# Patient Record
Sex: FEMALE | Race: WHITE | NOT HISPANIC OR LATINO
[De-identification: names, ages, dates, MRNs, and addresses within clinical notes are randomized per-mention and may not be internally consistent; named-entity substitution may affect disease eponyms.]

---

## 2021-02-05 ENCOUNTER — APPOINTMENT (OUTPATIENT)
Dept: INTERNAL MEDICINE | Facility: CLINIC | Age: 71
End: 2021-02-05
Payer: MEDICARE

## 2021-02-05 VITALS
HEART RATE: 66 BPM | DIASTOLIC BLOOD PRESSURE: 70 MMHG | HEIGHT: 68 IN | SYSTOLIC BLOOD PRESSURE: 100 MMHG | BODY MASS INDEX: 27.28 KG/M2 | OXYGEN SATURATION: 96 % | WEIGHT: 180 LBS | TEMPERATURE: 97.4 F

## 2021-02-05 DIAGNOSIS — Z80.9 FAMILY HISTORY OF MALIGNANT NEOPLASM, UNSPECIFIED: ICD-10-CM

## 2021-02-05 DIAGNOSIS — Z87.891 PERSONAL HISTORY OF NICOTINE DEPENDENCE: ICD-10-CM

## 2021-02-05 DIAGNOSIS — Z82.49 FAMILY HISTORY OF ISCHEMIC HEART DISEASE AND OTHER DISEASES OF THE CIRCULATORY SYSTEM: ICD-10-CM

## 2021-02-05 DIAGNOSIS — M85.80 OTHER SPECIFIED DISORDERS OF BONE DENSITY AND STRUCTURE, UNSPECIFIED SITE: ICD-10-CM

## 2021-02-05 DIAGNOSIS — Z72.89 OTHER PROBLEMS RELATED TO LIFESTYLE: ICD-10-CM

## 2021-02-05 DIAGNOSIS — Z81.8 FAMILY HISTORY OF OTHER MENTAL AND BEHAVIORAL DISORDERS: ICD-10-CM

## 2021-02-05 PROCEDURE — 99204 OFFICE O/P NEW MOD 45 MIN: CPT | Mod: 25

## 2021-02-05 RX ORDER — VALACYCLOVIR HYDROCHLORIDE 500 MG/1
500 TABLET, FILM COATED ORAL
Refills: 0 | Status: ACTIVE | COMMUNITY

## 2021-02-05 NOTE — PLAN
[FreeTextEntry1] : Asthma- controlled on minimal use of albuterol -continue prn use\par \par osteopenia- continue weight bearing exercises\par \par Anxiety- for prn use of benzo.\par \par HCM= uptodate= plan wellness with me or in [facility] this summer

## 2021-02-05 NOTE — HISTORY OF PRESENT ILLNESS
[de-identified] : Needs a new PMD: \par Father  of leukemia at age of 60 - [employer]\par 5 brothers- one developed MDS.  Has a mutant gene.  \par Mother with hx of dvt\par one brother had a CVA.    niece with hereditary telengiectasia\par SHe has been\par Cervical dysplasia, benign breast lumpectomy\par tubal ligation\par wrist fracture in \par Had a CCTA of 38, she does have cholesterol elevation  does not like taking medication.  \par Takes valacyclovir tiw for herpes outbreasks  \par dx of osteopenia-  does weight lifting and veryactive.  took fosmaax years ago.  \par Hx of IBS- meditation helped.\par Colonoscopy  was normal - Ezio Guo\par Mammo last week\par Dexa- slight change\par OPhtho check up yesterday.  \par Had covid vaccine  on  \par Had zostavax has not had shingrix. HD BOTH PNA ACCINATIONS.  \par Last wellness in august.in [facility].  \par Takes vitamin D 2000 iu daily, calcium 500mg,

## 2021-02-24 ENCOUNTER — TRANSCRIPTION ENCOUNTER (OUTPATIENT)
Age: 71
End: 2021-02-24

## 2021-02-26 ENCOUNTER — TRANSCRIPTION ENCOUNTER (OUTPATIENT)
Age: 71
End: 2021-02-26

## 2021-09-09 ENCOUNTER — TRANSCRIPTION ENCOUNTER (OUTPATIENT)
Age: 71
End: 2021-09-09

## 2022-03-10 LAB
ALBUMIN SERPL ELPH-MCNC: 4.5 G/DL
ALP BLD-CCNC: 88 U/L
ALT SERPL-CCNC: 18 U/L
ANION GAP SERPL CALC-SCNC: 13 MMOL/L
AST SERPL-CCNC: 24 U/L
BASOPHILS # BLD AUTO: 0.05 K/UL
BASOPHILS NFR BLD AUTO: 0.8 %
BILIRUB SERPL-MCNC: 0.2 MG/DL
BUN SERPL-MCNC: 22 MG/DL
CALCIUM SERPL-MCNC: 9.5 MG/DL
CHLORIDE SERPL-SCNC: 104 MMOL/L
CHOLEST SERPL-MCNC: 264 MG/DL
CO2 SERPL-SCNC: 26 MMOL/L
CREAT SERPL-MCNC: 1.1 MG/DL
EGFR: 53 ML/MIN/1.73M2
EOSINOPHIL # BLD AUTO: 0.16 K/UL
EOSINOPHIL NFR BLD AUTO: 2.6 %
GLUCOSE SERPL-MCNC: 108 MG/DL
HCT VFR BLD CALC: 43 %
HDLC SERPL-MCNC: 69 MG/DL
HGB BLD-MCNC: 13.6 G/DL
IMM GRANULOCYTES NFR BLD AUTO: 0.2 %
LDLC SERPL CALC-MCNC: 174 MG/DL
LYMPHOCYTES # BLD AUTO: 2.13 K/UL
LYMPHOCYTES NFR BLD AUTO: 34.6 %
MAN DIFF?: NORMAL
MCHC RBC-ENTMCNC: 31.5 PG
MCHC RBC-ENTMCNC: 31.6 GM/DL
MCV RBC AUTO: 99.5 FL
MONOCYTES # BLD AUTO: 0.44 K/UL
MONOCYTES NFR BLD AUTO: 7.1 %
NEUTROPHILS # BLD AUTO: 3.37 K/UL
NEUTROPHILS NFR BLD AUTO: 54.7 %
NONHDLC SERPL-MCNC: 195 MG/DL
PLATELET # BLD AUTO: 204 K/UL
POTASSIUM SERPL-SCNC: 4.4 MMOL/L
PROT SERPL-MCNC: 7.2 G/DL
RBC # BLD: 4.32 M/UL
RBC # FLD: 12.7 %
SODIUM SERPL-SCNC: 143 MMOL/L
TRIGL SERPL-MCNC: 106 MG/DL
TSH SERPL-ACNC: 2.64 UIU/ML
WBC # FLD AUTO: 6.16 K/UL

## 2022-04-19 ENCOUNTER — APPOINTMENT (OUTPATIENT)
Dept: INTERNAL MEDICINE | Facility: CLINIC | Age: 72
End: 2022-04-19
Payer: MEDICARE

## 2022-04-19 DIAGNOSIS — G89.29 LOW BACK PAIN, UNSPECIFIED: ICD-10-CM

## 2022-04-19 DIAGNOSIS — M54.50 LOW BACK PAIN, UNSPECIFIED: ICD-10-CM

## 2022-04-19 DIAGNOSIS — F41.9 ANXIETY DISORDER, UNSPECIFIED: ICD-10-CM

## 2022-04-19 PROCEDURE — 99213 OFFICE O/P EST LOW 20 MIN: CPT | Mod: 95

## 2022-04-19 NOTE — HISTORY OF PRESENT ILLNESS
[Home] : at home, [unfilled] , at the time of the visit. [Medical Office: ([facility])___] : at the medical office located in  [Verbal consent obtained from patient] : the patient, [unfilled] [de-identified] : \par C[place]rently down in [place]\par has high cholesterol\par Calcium scan in 2018 - score of 38.   plan to recheck in 5 years.    \par exercising regularly - eats well.  \par paternal gf  at 75 cad.  \par Needs refill of xanax 0.5mg to [facility].  \par had zostavax but not shingrix.  \par low back pain issues.   - exercise has helped

## 2022-04-19 NOTE — PLAN
[FreeTextEntry1] : Asthma- controlled on minimal use of albuterol -continue prn use\par \par low back pain - exercise as tolerated and monitor\par \par Anxiety-refill of xanax for prn use\par \par

## 2022-06-30 ENCOUNTER — TRANSCRIPTION ENCOUNTER (OUTPATIENT)
Age: 72
End: 2022-06-30

## 2022-09-15 ENCOUNTER — APPOINTMENT (OUTPATIENT)
Dept: HEART AND VASCULAR | Facility: CLINIC | Age: 72
End: 2022-09-15

## 2022-09-15 ENCOUNTER — NON-APPOINTMENT (OUTPATIENT)
Age: 72
End: 2022-09-15

## 2022-09-15 VITALS
TEMPERATURE: 98.1 F | HEART RATE: 72 BPM | OXYGEN SATURATION: 96 % | WEIGHT: 185 LBS | HEIGHT: 68 IN | BODY MASS INDEX: 28.04 KG/M2 | SYSTOLIC BLOOD PRESSURE: 98 MMHG | DIASTOLIC BLOOD PRESSURE: 67 MMHG

## 2022-09-15 PROCEDURE — 93000 ELECTROCARDIOGRAM COMPLETE: CPT

## 2022-09-15 PROCEDURE — 99205 OFFICE O/P NEW HI 60 MIN: CPT | Mod: 25

## 2022-09-15 PROCEDURE — 36415 COLL VENOUS BLD VENIPUNCTURE: CPT

## 2022-09-15 RX ORDER — ASPIRIN 81 MG/1
81 TABLET ORAL DAILY
Qty: 30 | Refills: 6 | Status: ACTIVE | COMMUNITY
Start: 2022-09-15 | End: 1900-01-01

## 2022-09-15 NOTE — HISTORY OF PRESENT ILLNESS
[FreeTextEntry1] : \par 73 y/o female with h/o cad, anxiety, asthma, hl, overweight, family h/o early cvd who presents for initial evaluation today.\par \par no cp, sob, syncope, lh, palpitations\par no orthopnea, pnd, edema\par no fatigue\par \par Calcium score 2018: 38\par \par Echo : normal LV size/fxn, ef 55-60%, no wma, mlld diastolic dysfunction, mild mac, trace mr/tr, asc ao 3.6 cm upper limit normal to mildly dilated\par \par tested by nancy - Dr. Meek [facility] - for clotting d/o - negative \par \par swims 2-3 times/week, walks 8-21222 steps daily, strength balance class, yoga\par no mental health issues\par no pregnancy complications\par stress level low - meditates \par eats healthy diet \par \par had covid \par \par PMH/PSH:\par asthma\par hl\par anxiety\par osteopenia\par cad\par benign breast lumpectomy\par tubal ligation\par herpes\par IBS\par overweight\par abortions\par covid \par \par ALL:\par sulfa\par \par \par MEDS:\par albuterol prn\par alprazolam .25 mg prn\par valtrex 500 mg three times/week\par THC gummies \par calcium\par vit D\par probiotic \par \par \par SH:\par former smoker in [age] y/o, 3-4 cigs/day\par etoh\par no drugs\par \par 2 children 40's - healthy\par from [place]\par lived [place] since \par writer\par \par \par FH:\par mother - h/o dvt,  86\par father -  leukemia 60\par 5 brothers - (1 brother - cva age 29, 1 brother - leukemia, 1 brother - cva 55)\par

## 2022-09-15 NOTE — DISCUSSION/SUMMARY
[Patient] : the patient [EKG obtained to assist in diagnosis and management of assessed problem(s)] : EKG obtained to assist in diagnosis and management of assessed problem(s) [___ Month(s)] : in [unfilled] month(s) [FreeTextEntry1] : 71 y/o female with h/o cad, anxiety, asthma, hl, overweight, family h/o early cvd who presents for initial evaluation today.\par \par -ordered ekg today - SR w pac, rbbb, no st/t changes\par -holter ordered for pac's\par -labs ordered\par -asa, statin given cad - start lipitor 40 mg qhs\par -Calcium score 2018: 38\par -Echo 9/21: normal LV size/fxn, ef 55-60%, mlld diastolic dysfunction, mild mac, trace mr/tr, asc ao 3.6 cm upper limit normal to mildly dilated\par -counseled on cvd risk factors\par -f/up 3 months for lipid\par \par I have spent 60 minutes reviewing labs, records, tests and discussing cad, cvd management

## 2022-09-19 LAB
ALBUMIN SERPL ELPH-MCNC: 4.5 G/DL
ALP BLD-CCNC: 94 U/L
ALT SERPL-CCNC: 21 U/L
ANION GAP SERPL CALC-SCNC: 13 MMOL/L
APPEARANCE: CLEAR
AST SERPL-CCNC: 26 U/L
BACTERIA: NEGATIVE
BASOPHILS # BLD AUTO: 0.05 K/UL
BASOPHILS NFR BLD AUTO: 0.8 %
BILIRUB SERPL-MCNC: 0.4 MG/DL
BILIRUBIN URINE: NEGATIVE
BLOOD URINE: NEGATIVE
BUN SERPL-MCNC: 18 MG/DL
CALCIUM SERPL-MCNC: 9.5 MG/DL
CHLORIDE SERPL-SCNC: 105 MMOL/L
CHOLEST SERPL-MCNC: 280 MG/DL
CO2 SERPL-SCNC: 24 MMOL/L
COLOR: NORMAL
CREAT SERPL-MCNC: 0.96 MG/DL
EGFR: 62 ML/MIN/1.73M2
EOSINOPHIL # BLD AUTO: 0.15 K/UL
EOSINOPHIL NFR BLD AUTO: 2.4 %
ESTIMATED AVERAGE GLUCOSE: 123 MG/DL
GLUCOSE QUALITATIVE U: NEGATIVE
GLUCOSE SERPL-MCNC: 96 MG/DL
HBA1C MFR BLD HPLC: 5.9 %
HCT VFR BLD CALC: 43.2 %
HDLC SERPL-MCNC: 79 MG/DL
HGB BLD-MCNC: 13.5 G/DL
HYALINE CASTS: 0 /LPF
IMM GRANULOCYTES NFR BLD AUTO: 0.2 %
KETONES URINE: NEGATIVE
LDLC SERPL CALC-MCNC: 186 MG/DL
LEUKOCYTE ESTERASE URINE: NEGATIVE
LYMPHOCYTES # BLD AUTO: 1.84 K/UL
LYMPHOCYTES NFR BLD AUTO: 29.7 %
MAGNESIUM SERPL-MCNC: 2.2 MG/DL
MAN DIFF?: NORMAL
MCHC RBC-ENTMCNC: 30.3 PG
MCHC RBC-ENTMCNC: 31.3 GM/DL
MCV RBC AUTO: 97.1 FL
MICROSCOPIC-UA: NORMAL
MONOCYTES # BLD AUTO: 0.44 K/UL
MONOCYTES NFR BLD AUTO: 7.1 %
NEUTROPHILS # BLD AUTO: 3.71 K/UL
NEUTROPHILS NFR BLD AUTO: 59.8 %
NITRITE URINE: NEGATIVE
NONHDLC SERPL-MCNC: 202 MG/DL
PH URINE: 6
PLATELET # BLD AUTO: 207 K/UL
POTASSIUM SERPL-SCNC: 4.3 MMOL/L
PROT SERPL-MCNC: 7.2 G/DL
PROTEIN URINE: NEGATIVE
RBC # BLD: 4.45 M/UL
RBC # FLD: 13.4 %
RED BLOOD CELLS URINE: 4 /HPF
SODIUM SERPL-SCNC: 142 MMOL/L
SPECIFIC GRAVITY URINE: 1.02
SQUAMOUS EPITHELIAL CELLS: 1 /HPF
TRIGL SERPL-MCNC: 80 MG/DL
TSH SERPL-ACNC: 1.95 UIU/ML
UROBILINOGEN URINE: NORMAL
WBC # FLD AUTO: 6.2 K/UL
WHITE BLOOD CELLS URINE: 1 /HPF

## 2022-09-21 ENCOUNTER — APPOINTMENT (OUTPATIENT)
Dept: INTERNAL MEDICINE | Facility: CLINIC | Age: 72
End: 2022-09-21

## 2022-09-21 VITALS
OXYGEN SATURATION: 98 % | HEART RATE: 68 BPM | BODY MASS INDEX: 28.04 KG/M2 | HEIGHT: 68 IN | WEIGHT: 185 LBS | SYSTOLIC BLOOD PRESSURE: 106 MMHG | DIASTOLIC BLOOD PRESSURE: 70 MMHG | TEMPERATURE: 97.8 F

## 2022-09-21 DIAGNOSIS — J45.909 UNSPECIFIED ASTHMA, UNCOMPLICATED: ICD-10-CM

## 2022-09-21 DIAGNOSIS — Z23 ENCOUNTER FOR IMMUNIZATION: ICD-10-CM

## 2022-09-21 DIAGNOSIS — I25.10 ATHEROSCLEROTIC HEART DISEASE OF NATIVE CORONARY ARTERY W/OUT ANGINA PECTORIS: ICD-10-CM

## 2022-09-21 PROCEDURE — G0439: CPT

## 2022-09-21 NOTE — HISTORY OF PRESENT ILLNESS
[de-identified] : 73 yo f with HLD, IBS, osteopenia\par \par over the summer ate a lot of ice cream  -  has seen Dr Burnett.  started on lipitor last week.\par takes prn allegra, probiotic for her iBS, metamucil, calcium with D, magnesium at night.\par Colonoscopy 2018 was normal - Ezio Guo\par mammo \par  dexa last year\par still walking a lot, weights twice a week and some yoga.\par recent labs a1c of 5/9%\par already had flu\par  mild covid  last month  \par rash on right side - concerned for shingle

## 2022-09-21 NOTE — HISTORY OF PRESENT ILLNESS
[de-identified] : 71 yo f with HLD, IBS, osteopenia\par \par over the summer ate a lot of ice cream  -  has seen Dr Burnett.  started on lipitor last week.\par takes prn allegra, probiotic for her iBS, metamucil, calcium with D, magnesium at night.\par Colonoscopy 2018 was normal - Ezio Guo\par mammo \par  dexa last year\par still walking a lot, weights twice a week and some yoga.\par recent labs a1c of 5/9%\par already had flu\par  mild covid  last month  \par rash on right side - concerned for shingle

## 2022-09-21 NOTE — PLAN
[FreeTextEntry1] : wellness complete\par labs  reviewed\par \par Asthma- controlled on minimal use of albuterol -continue prn use\par \par ASCVD - statin and lifestyle changes.\par f/up with Dr Burnett\par \par Anxiety-refill of xanax for prn use\par \par

## 2022-09-21 NOTE — PHYSICAL EXAM
[No Edema] : there was no peripheral edema [Normal] : affect was normal and insight and judgment were intact aching

## 2022-09-22 ENCOUNTER — APPOINTMENT (OUTPATIENT)
Dept: CARDIOLOGY | Facility: CLINIC | Age: 72
End: 2022-09-22

## 2022-09-22 DIAGNOSIS — E66.3 OVERWEIGHT: ICD-10-CM

## 2022-09-22 DIAGNOSIS — R73.03 PREDIABETES.: ICD-10-CM

## 2022-09-22 DIAGNOSIS — E78.5 HYPERLIPIDEMIA, UNSPECIFIED: ICD-10-CM

## 2022-09-22 PROCEDURE — 97802 MEDICAL NUTRITION INDIV IN: CPT | Mod: 95

## 2022-11-14 RX ORDER — ALBUTEROL SULFATE 90 UG/1
108 (90 BASE) INHALANT RESPIRATORY (INHALATION) EVERY 4 HOURS
Qty: 1 | Refills: 11 | Status: ACTIVE | COMMUNITY
Start: 2021-02-05 | End: 1900-01-01

## 2022-11-20 ENCOUNTER — NON-APPOINTMENT (OUTPATIENT)
Age: 72
End: 2022-11-20

## 2022-11-22 RX ORDER — FLUTICASONE PROPIONATE 110 UG/1
110 AEROSOL, METERED RESPIRATORY (INHALATION) TWICE DAILY
Qty: 1 | Refills: 0 | Status: ACTIVE | COMMUNITY
Start: 2022-11-22 | End: 1900-01-01

## 2022-11-22 RX ORDER — BENZONATATE 100 MG/1
100 CAPSULE ORAL
Qty: 90 | Refills: 0 | Status: DISCONTINUED | COMMUNITY
Start: 2022-11-21 | End: 2022-11-22

## 2022-11-24 ENCOUNTER — NON-APPOINTMENT (OUTPATIENT)
Age: 72
End: 2022-11-24

## 2022-11-25 ENCOUNTER — NON-APPOINTMENT (OUTPATIENT)
Age: 72
End: 2022-11-25

## 2022-12-15 ENCOUNTER — APPOINTMENT (OUTPATIENT)
Dept: HEART AND VASCULAR | Facility: CLINIC | Age: 72
End: 2022-12-15

## 2022-12-15 VITALS
HEIGHT: 68 IN | TEMPERATURE: 97.7 F | DIASTOLIC BLOOD PRESSURE: 46 MMHG | OXYGEN SATURATION: 95 % | SYSTOLIC BLOOD PRESSURE: 111 MMHG | HEART RATE: 70 BPM | WEIGHT: 178 LBS | BODY MASS INDEX: 26.98 KG/M2

## 2022-12-15 PROCEDURE — 36415 COLL VENOUS BLD VENIPUNCTURE: CPT

## 2022-12-15 PROCEDURE — 99214 OFFICE O/P EST MOD 30 MIN: CPT | Mod: 25

## 2022-12-15 NOTE — HISTORY OF PRESENT ILLNESS
[FreeTextEntry1] : 71 y/o female with h/o cad, anxiety, asthma, hl, predm, pac's, overweight, family h/o early cvd who presents for f/up today\par \par last seen \par \par started on asa and lipitor \par \par has persistent cough after viral URI - has h/o asthma - has not seen pulm \par \par \par holter ordered\par \par Holter : SR, avg hr 67, 775 pvc's, [id number] pac's, 14% total beats, psvt\par \par planning to see EP next month\par \par no cp, sob, syncope, lh, palpitations\par no orthopnea, pnd, edema\par no fatigue\par \par Calcium score 2018: 38\par \par Echo : normal LV size/fxn, ef 55-60%, no wma, mlld diastolic dysfunction, mild mac, trace mr/tr, asc ao 3.6 cm upper limit normal to mildly dilated\par \par tested by nancy - Dr. Meek [facility] - for clotting d/o - negative \par \par swims 2-3 times/week, walks 8-32304 steps daily, strength balance class, yoga\par no mental health issues\par no pregnancy complications\par stress level low - meditates \par eats healthy diet \par \par had covid \par \par PMH/PSH:\par asthma\par hl\par anxiety\par osteopenia\par cad\par benign breast lumpectomy\par tubal ligation\par herpes\par IBS\par overweight\par abortions\par covid \par predm\par pac's\par \par ALL:\par sulfa\par \par \par MEDS:\par albuterol prn\par alprazolam .25 mg prn\par valtrex 500 mg three times/week\par THC gummies \par calcium\par vit D\par probiotic \par asa 81 mg qd\par lipitor 20 mg qhs\par \par SH:\par former smoker in [age] y/o, 3-4 cigs/day\par etoh\par no drugs\par \par 2 children 40's - healthy\par from [place]\par lived [place] since \par writer\par \par \par FH:\par mother - h/o dvt,  86\par father -  leukemia 60\par 5 brothers - (1 brother - cva age 29, 1 brother - leukemia, 1 brother - cva 55)\par

## 2022-12-15 NOTE — DISCUSSION/SUMMARY
[Patient] : the patient [___ Month(s)] : in [unfilled] month(s) [FreeTextEntry1] : 71 y/o female with h/o cad, anxiety, asthma, hl, overweight, predm, pac's, family h/o early cvd who presents for f/up today.\par \par -ekg 9/22 - SR w pac, rbbb, no st/t changes\par -Holter 2022: SR, avg hr 67, 775 pvc's, 51666 pac's, 14% total beats, psvt\par -EP referral for pac's\par -labs 2022 reviewed\par -continue asa, statin \par -ordered lipids, A1c today \par -refer to pulm for asthma\par -Calcium score 2018: 38\par -Echo 9/21: normal LV size/fxn, ef 55-60%, mlld diastolic dysfunction, mild mac, trace mr/tr, asc ao 3.6 cm upper limit normal to mildly dilated\par -counseled on cvd risk factors\par -f/up 4-6 months for lipids\par \par I have spent 30 minutes reviewing labs, records, tests and discussing cad, cvd management, pac's. \par \par

## 2022-12-19 LAB
CHOLEST SERPL-MCNC: 178 MG/DL
ESTIMATED AVERAGE GLUCOSE: 120 MG/DL
HBA1C MFR BLD HPLC: 5.8 %
HDLC SERPL-MCNC: 65 MG/DL
LDLC SERPL CALC-MCNC: 99 MG/DL
NONHDLC SERPL-MCNC: 113 MG/DL
TRIGL SERPL-MCNC: 68 MG/DL

## 2022-12-20 ENCOUNTER — TRANSCRIPTION ENCOUNTER (OUTPATIENT)
Age: 72
End: 2022-12-20

## 2022-12-21 ENCOUNTER — NON-APPOINTMENT (OUTPATIENT)
Age: 72
End: 2022-12-21

## 2022-12-31 RX ORDER — ALPRAZOLAM 0.5 MG/1
0.5 TABLET ORAL
Qty: 30 | Refills: 0 | Status: ACTIVE | COMMUNITY
Start: 2021-02-05 | End: 1900-01-01

## 2023-01-13 ENCOUNTER — APPOINTMENT (OUTPATIENT)
Dept: PULMONOLOGY | Facility: CLINIC | Age: 73
End: 2023-01-13
Payer: MEDICARE

## 2023-01-13 VITALS
OXYGEN SATURATION: 96 % | WEIGHT: 178 LBS | HEIGHT: 68 IN | SYSTOLIC BLOOD PRESSURE: 110 MMHG | BODY MASS INDEX: 26.98 KG/M2 | HEART RATE: 62 BPM | DIASTOLIC BLOOD PRESSURE: 73 MMHG

## 2023-01-13 PROCEDURE — 95012 NITRIC OXIDE EXP GAS DETER: CPT

## 2023-01-13 PROCEDURE — 94010 BREATHING CAPACITY TEST: CPT

## 2023-01-13 PROCEDURE — 99204 OFFICE O/P NEW MOD 45 MIN: CPT | Mod: 25

## 2023-01-13 RX ORDER — AMOXICILLIN AND CLAVULANATE POTASSIUM 500; 125 MG/1; MG/1
500-125 TABLET, FILM COATED ORAL
Qty: 14 | Refills: 0 | Status: DISCONTINUED | COMMUNITY
Start: 2022-11-25 | End: 2023-01-13

## 2023-01-13 NOTE — ASSESSMENT
[FreeTextEntry1] : Data reviewed:\par \par Labs notable for eos 150-160 in 2022\par \par PA/lat CXR [facility] 11/2022 personally reviewed : clear lungs\par \par [place] 01/13/2023 : normal, FEV1 80% / FENO 31\par \par Impression:\par Recovering from URI\par Childhood asthma\par \par Plan:\par No evidence of any active asthma today, nothing that needs to be treated.\par Welcome to see me at any time for new concerns.\par Reports having had pneumococcal vax at pharmacy.

## 2023-01-13 NOTE — HISTORY OF PRESENT ILLNESS
[TextBox_4] : 01/13/2023: Pt of Dr Rizvi and Dr Burnett sent for asthma. Asthma from age [age] growing up in [place]. Asthma runs in family. But after that really did not have symptoms, would just occasionally need treatment if she had a cold. Had a bad cold for about a month beginning early Nov with asthma symptoms, wheezing, more dyspnea on stairs. This has improved after the cold symptoms improved. Used albuterol with some relief, gets a little jittery. Dr Rizvi added Flovent which did not improve her symptoms. She stopped the Flovent and hasn't needed albuterol. Although better, not back to her baseline. Walks a great deal, does an exercise class. Smoked in her 20s only. Is a writer, was just on a book tour.\par  [ESS] : 1

## 2023-01-30 ENCOUNTER — APPOINTMENT (OUTPATIENT)
Dept: HEART AND VASCULAR | Facility: CLINIC | Age: 73
End: 2023-01-30
Payer: MEDICARE

## 2023-01-30 VITALS
BODY MASS INDEX: 26.52 KG/M2 | SYSTOLIC BLOOD PRESSURE: 120 MMHG | HEIGHT: 68 IN | DIASTOLIC BLOOD PRESSURE: 58 MMHG | WEIGHT: 175 LBS | HEART RATE: 60 BPM

## 2023-01-30 PROCEDURE — 99204 OFFICE O/P NEW MOD 45 MIN: CPT

## 2023-01-30 PROCEDURE — 93000 ELECTROCARDIOGRAM COMPLETE: CPT

## 2023-01-31 NOTE — HISTORY OF PRESENT ILLNESS
[FreeTextEntry1] : Ms. Marsh is a pleasant 72 year-old female with a past medical history significant for CAD, ANxiety, Asthma, HLD, Pre-DM and PACs who is referred by Dr. Burnett for evaluation. \par \par Recent cardiac monitor was significant for PAC burden 14%.  She endorses a history of snoring but has not had a formal sleep study. \par \par She is not aware of any rapid/irregular heart action.  No SOB/ELENA, dizziness, presyncope/syncope.  \par \par Very active- swims, weight training, walks without limitation.  \par

## 2023-01-31 NOTE — DISCUSSION/SUMMARY
[FreeTextEntry1] : Ms. Marsh is a pleasant 72 year-old female with a past medical history significant for CAD, ANxiety, Asthma, HLD, Pre-DM and PACs who is referred by Dr. Burnett for evaluation. \par \par Her recent cardiac monitor demonstrated a significant burden of PACs.  We discussed that although these are a benign arrhythmia finding, they may be a precursor to the development of atrial fibrillation.  Given her history of snoring, I recommend a sleep study to evaluate for BYRON.  We will plan for an extended cardiac monitor at her next visit.  No changes to her medication regimen were recommended.  All questions were answered to her apparent satisfaction.

## 2023-01-31 NOTE — CARDIOLOGY SUMMARY
[de-identified] : SR with PACs, RBBB [de-identified] : Brandie 9/15 - 9/16/22: SR with AVR 67 bpm, SVE burden 14.65% [de-identified] : TTE 9/8/21: \par Normal LV size and thickeness, LVEF 55-60%, no WMA, LA size normal,trace MR, trace TR

## 2023-01-31 NOTE — PHYSICAL EXAM
[Well Developed] : well developed [Well Nourished] : well nourished [No Acute Distress] : no acute distress [Normal Conjunctiva] : normal conjunctiva [Normal Venous Pressure] : normal venous pressure [No Carotid Bruit] : no carotid bruit [Normal S1, S2] : normal S1, S2 [No Murmur] : no murmur [No Rub] : no rub [No Gallop] : no gallop [5th Left ICS - MCL] : palpated at the 5th LICS in the midclavicular line [Normal Rate] : normal [Premature Beats] : regular with premature beats [Clear Lung Fields] : clear lung fields [Good Air Entry] : good air entry [No Respiratory Distress] : no respiratory distress  [Soft] : abdomen soft [Non Tender] : non-tender [No Masses/organomegaly] : no masses/organomegaly [Normal Bowel Sounds] : normal bowel sounds [Normal Gait] : normal gait [No Edema] : no edema [No Cyanosis] : no cyanosis [No Clubbing] : no clubbing [No Varicosities] : no varicosities [No Rash] : no rash [No Skin Lesions] : no skin lesions [Moves all extremities] : moves all extremities [No Focal Deficits] : no focal deficits [Normal Speech] : normal speech [Alert and Oriented] : alert and oriented [Normal memory] : normal memory

## 2023-03-02 ENCOUNTER — OUTPATIENT (OUTPATIENT)
Dept: OUTPATIENT SERVICES | Facility: HOSPITAL | Age: 73
LOS: 1 days | End: 2023-03-02
Payer: MEDICARE

## 2023-03-02 ENCOUNTER — APPOINTMENT (OUTPATIENT)
Dept: SLEEP CENTER | Facility: HOSPITAL | Age: 73
End: 2023-03-02

## 2023-03-02 DIAGNOSIS — G47.33 OBSTRUCTIVE SLEEP APNEA (ADULT) (PEDIATRIC): ICD-10-CM

## 2023-03-02 PROCEDURE — 95810 POLYSOM 6/> YRS 4/> PARAM: CPT | Mod: 26

## 2023-03-02 PROCEDURE — 95810 POLYSOM 6/> YRS 4/> PARAM: CPT

## 2023-03-06 ENCOUNTER — TRANSCRIPTION ENCOUNTER (OUTPATIENT)
Age: 73
End: 2023-03-06

## 2023-06-01 ENCOUNTER — APPOINTMENT (OUTPATIENT)
Dept: HEART AND VASCULAR | Facility: CLINIC | Age: 73
End: 2023-06-01
Payer: MEDICARE

## 2023-06-01 VITALS
DIASTOLIC BLOOD PRESSURE: 65 MMHG | BODY MASS INDEX: 25.77 KG/M2 | WEIGHT: 172 LBS | TEMPERATURE: 97.9 F | HEIGHT: 68.5 IN | OXYGEN SATURATION: 97 % | HEART RATE: 65 BPM | SYSTOLIC BLOOD PRESSURE: 121 MMHG

## 2023-06-01 PROCEDURE — 99214 OFFICE O/P EST MOD 30 MIN: CPT | Mod: 25

## 2023-06-01 PROCEDURE — 36415 COLL VENOUS BLD VENIPUNCTURE: CPT

## 2023-06-01 PROCEDURE — 93000 ELECTROCARDIOGRAM COMPLETE: CPT

## 2023-06-01 NOTE — DISCUSSION/SUMMARY
[Patient] : the patient [___ Month(s)] : in [unfilled] month(s) [EKG obtained to assist in diagnosis and management of assessed problem(s)] : EKG obtained to assist in diagnosis and management of assessed problem(s) [FreeTextEntry1] : 71 y/o female with h/o cad, anxiety, asthma, hl, overweight, predm, pac's, family h/o early cvd who presents for f/up today.\par \par -ordered echo for pac's \par -ekg ordered today - SR w pac, rbbb, no st/t changes\par -ordered lipids, A1c\par -labs 2022 reviewed\par -Holter 2022: SR, avg hr 67, 775 pvc's, [id number] pac's, 14% total beats, psvt\par -EP recs for pac's, long term monitor in fall 2023\par -continue asa, statin \par -pulm f/up for asthma\par -Calcium score 2018: 38\par -Echo 9/21: normal LV size/fxn, ef 55-60%, mlld diastolic dysfunction, mild mac, trace mr/tr, asc ao 3.6 cm upper limit normal to mildly dilated\par -counseled on cvd risk factors\par -f/up 4-6 months for lipids, cad\par \par I have spent 30 minutes reviewing labs, records, tests and discussing cad, cvd management, pac's.

## 2023-06-01 NOTE — HISTORY OF PRESENT ILLNESS
[FreeTextEntry1] : 73 y/o female with h/o cad, anxiety, asthma, hl, predm, pac's, overweight, family h/o early cvd who presents for f/up today\par \par last seen \par \par seen by EP - will get longer monitor in 2023 to assess for afib\par recommended sleep study which was wnl\par \par on asa and lipitor \par \par seen by pulm for asthma\par \par Holter : SR, avg hr 67, 775 pvc's, 25365 pac's, 14% total beats, psvt\par \par no cp, sob, syncope, lh, palpitations\par no orthopnea, pnd, edema\par no fatigue\par \par Calcium score 2018: 38\par \par Echo : normal LV size/fxn, ef 55-60%, no wma, mlld diastolic dysfunction, mild mac, trace mr/tr, asc ao 3.6 cm upper limit normal to mildly dilated\par \par tested by nancy - Dr. Meek [facility] - for clotting d/o - negative \par \par swims 2-3 times/week, walks 8-58108 steps daily, strength balance class, yoga\par no mental health issues\par no pregnancy complications\par stress level low - meditates \par eats healthy diet \par \par had covid \par \par PMH/PSH:\par asthma\par hl\par anxiety\par osteopenia\par cad\par benign breast lumpectomy\par tubal ligation\par herpes\par IBS\par overweight\par abortions\par covid \par predm\par pac's\par \par ALL:\par sulfa\par \par \par MEDS:\par albuterol prn\par alprazolam .25 mg prn\par valtrex 500 mg three times/week\par THC gummies \par calcium\par vit D\par probiotic \par asa 81 mg qd\par lipitor 20 mg qhs\par \par SH:\par former smoker in [age] y/o, 3-4 cigs/day\par etoh\par no drugs\par \par 2 children 40's - healthy\par from [place]\par lived [place] since \par writer\par \par \par FH:\par mother - h/o dvt,  86\par father -  leukemia 60\par 5 brothers - (1 brother - cva age 29, 1 brother - leukemia, 1 brother - cva 55)\par \par

## 2023-06-05 LAB
CHOLEST SERPL-MCNC: 182 MG/DL
ESTIMATED AVERAGE GLUCOSE: 117 MG/DL
HBA1C MFR BLD HPLC: 5.7 %
HDLC SERPL-MCNC: 69 MG/DL
LDLC SERPL CALC-MCNC: 100 MG/DL
NONHDLC SERPL-MCNC: 113 MG/DL
TRIGL SERPL-MCNC: 67 MG/DL

## 2023-08-11 ENCOUNTER — TRANSCRIPTION ENCOUNTER (OUTPATIENT)
Age: 73
End: 2023-08-11

## 2023-08-31 ENCOUNTER — NON-APPOINTMENT (OUTPATIENT)
Age: 73
End: 2023-08-31

## 2023-09-21 ENCOUNTER — APPOINTMENT (OUTPATIENT)
Dept: HEART AND VASCULAR | Facility: CLINIC | Age: 73
End: 2023-09-21
Payer: MEDICARE

## 2023-09-21 VITALS
DIASTOLIC BLOOD PRESSURE: 61 MMHG | OXYGEN SATURATION: 95 % | WEIGHT: 172 LBS | HEIGHT: 68.5 IN | HEART RATE: 51 BPM | BODY MASS INDEX: 25.77 KG/M2 | SYSTOLIC BLOOD PRESSURE: 96 MMHG

## 2023-09-21 DIAGNOSIS — I45.10 UNSPECIFIED RIGHT BUNDLE-BRANCH BLOCK: ICD-10-CM

## 2023-09-21 PROCEDURE — 93306 TTE W/DOPPLER COMPLETE: CPT

## 2023-09-26 ENCOUNTER — APPOINTMENT (OUTPATIENT)
Dept: INTERNAL MEDICINE | Facility: CLINIC | Age: 73
End: 2023-09-26
Payer: MEDICARE

## 2023-09-26 VITALS
OXYGEN SATURATION: 97 % | WEIGHT: 171 LBS | BODY MASS INDEX: 25.62 KG/M2 | DIASTOLIC BLOOD PRESSURE: 69 MMHG | TEMPERATURE: 98.7 F | HEIGHT: 68.5 IN | SYSTOLIC BLOOD PRESSURE: 104 MMHG | HEART RATE: 56 BPM | RESPIRATION RATE: 12 BRPM

## 2023-09-26 DIAGNOSIS — Z00.00 ENCOUNTER FOR GENERAL ADULT MEDICAL EXAMINATION W/OUT ABNORMAL FINDINGS: ICD-10-CM

## 2023-09-26 PROCEDURE — G0439: CPT

## 2023-09-26 PROCEDURE — 90662 IIV NO PRSV INCREASED AG IM: CPT

## 2023-09-26 PROCEDURE — G0008: CPT

## 2023-09-26 PROCEDURE — 36415 COLL VENOUS BLD VENIPUNCTURE: CPT

## 2023-09-27 LAB
ABO + RH PNL BLD: NORMAL
ALBUMIN SERPL ELPH-MCNC: 4.8 G/DL
ALP BLD-CCNC: 123 U/L
ALT SERPL-CCNC: 40 U/L
ANION GAP SERPL CALC-SCNC: 11 MMOL/L
APPEARANCE: CLEAR
AST SERPL-CCNC: 38 U/L
BASOPHILS # BLD AUTO: 0.05 K/UL
BASOPHILS NFR BLD AUTO: 1 %
BILIRUB SERPL-MCNC: 0.5 MG/DL
BILIRUBIN URINE: NEGATIVE
BLOOD URINE: NEGATIVE
BUN SERPL-MCNC: 20 MG/DL
CALCIUM SERPL-MCNC: 9.9 MG/DL
CHLORIDE SERPL-SCNC: 103 MMOL/L
CHOLEST SERPL-MCNC: 197 MG/DL
CO2 SERPL-SCNC: 28 MMOL/L
COLOR: YELLOW
CREAT SERPL-MCNC: 1.04 MG/DL
EGFR: 56 ML/MIN/1.73M2
EOSINOPHIL # BLD AUTO: 0.15 K/UL
EOSINOPHIL NFR BLD AUTO: 3 %
ESTIMATED AVERAGE GLUCOSE: 126 MG/DL
GLUCOSE QUALITATIVE U: NEGATIVE MG/DL
GLUCOSE SERPL-MCNC: 93 MG/DL
HBA1C MFR BLD HPLC: 6 %
HCT VFR BLD CALC: 45 %
HDLC SERPL-MCNC: 76 MG/DL
HGB BLD-MCNC: 13.8 G/DL
IMM GRANULOCYTES NFR BLD AUTO: 0.2 %
KETONES URINE: NEGATIVE MG/DL
LDLC SERPL CALC-MCNC: 106 MG/DL
LEUKOCYTE ESTERASE URINE: NEGATIVE
LYMPHOCYTES # BLD AUTO: 1.48 K/UL
LYMPHOCYTES NFR BLD AUTO: 29.9 %
MAN DIFF?: NORMAL
MCHC RBC-ENTMCNC: 30.7 GM/DL
MCHC RBC-ENTMCNC: 30.9 PG
MCV RBC AUTO: 100.9 FL
MONOCYTES # BLD AUTO: 0.4 K/UL
MONOCYTES NFR BLD AUTO: 8.1 %
NEUTROPHILS # BLD AUTO: 2.86 K/UL
NEUTROPHILS NFR BLD AUTO: 57.8 %
NITRITE URINE: NEGATIVE
NONHDLC SERPL-MCNC: 121 MG/DL
PH URINE: 7
PLATELET # BLD AUTO: 195 K/UL
POTASSIUM SERPL-SCNC: 4.7 MMOL/L
PROT SERPL-MCNC: 7.3 G/DL
PROTEIN URINE: NEGATIVE MG/DL
RBC # BLD: 4.46 M/UL
RBC # FLD: 14 %
SODIUM SERPL-SCNC: 142 MMOL/L
SPECIFIC GRAVITY URINE: 1.02
TRIGL SERPL-MCNC: 81 MG/DL
TSH SERPL-ACNC: 2.08 UIU/ML
UROBILINOGEN URINE: 0.2 MG/DL
VIT B12 SERPL-MCNC: 891 PG/ML
WBC # FLD AUTO: 4.95 K/UL

## 2023-09-28 ENCOUNTER — NON-APPOINTMENT (OUTPATIENT)
Age: 73
End: 2023-09-28

## 2023-10-26 ENCOUNTER — APPOINTMENT (OUTPATIENT)
Dept: INTERNAL MEDICINE | Facility: CLINIC | Age: 73
End: 2023-10-26

## 2023-12-27 ENCOUNTER — RX RENEWAL (OUTPATIENT)
Age: 73
End: 2023-12-27

## 2024-03-12 ENCOUNTER — APPOINTMENT (OUTPATIENT)
Dept: HEART AND VASCULAR | Facility: CLINIC | Age: 74
End: 2024-03-12
Payer: MEDICARE

## 2024-03-12 VITALS
DIASTOLIC BLOOD PRESSURE: 70 MMHG | HEART RATE: 63 BPM | TEMPERATURE: 98.6 F | BODY MASS INDEX: 25.62 KG/M2 | WEIGHT: 171 LBS | SYSTOLIC BLOOD PRESSURE: 105 MMHG | OXYGEN SATURATION: 95 % | HEIGHT: 68.5 IN

## 2024-03-12 PROCEDURE — 36415 COLL VENOUS BLD VENIPUNCTURE: CPT

## 2024-03-12 PROCEDURE — 99214 OFFICE O/P EST MOD 30 MIN: CPT

## 2024-03-12 PROCEDURE — 93000 ELECTROCARDIOGRAM COMPLETE: CPT

## 2024-03-12 PROCEDURE — G2211 COMPLEX E/M VISIT ADD ON: CPT

## 2024-03-12 NOTE — DISCUSSION/SUMMARY
[Patient] : the patient [___ Month(s)] : in [unfilled] month(s) [EKG obtained to assist in diagnosis and management of assessed problem(s)] : EKG obtained to assist in diagnosis and management of assessed problem(s) [FreeTextEntry1] : 72 y/o female with h/o cad, anxiety, asthma, hl, overweight, predm, pac's, family h/o early cvd who presents for f/up today.   -Echo 9/23: 1. The left ventricular cavity is normal size. Left ventricular wall thickness is normal. Left ventricular systolic function is normal with an ejection fraction visually estimated at 60 %. 2. Right ventricular cavity is normal in size, normal wall thickness and normal systolic function. 3. There is normal left ventricular diastolic function. 4. No significant valvular disease. 5. No pericardial effusion seen. 6. There is calcification of the mitral valve annulus.  -ekg ordered today - SR w pac, rbbb, no st/t changes  -ordered lipids, A1c, LpA  -labs 2023 reviewed  -Holter 2022: SR, avg hr 67, 775 pvc's, [id number] pac's, 14% total beats, psvt  -EP recs for pac's, f/up for monitor    -continue asa, statin  -pulm f/up for asthma  -Calcium score 2018: 38  -Echo 9/21: normal LV size/fxn, ef 55-60%, mlld diastolic dysfunction, mild mac, trace mr/tr, asc ao 3.6 cm upper limit normal to mildly dilated  -counseled on cvd risk factors  -f/up 3 months for lipids, cad    I have spent 30 minutes reviewing labs, records, tests and discussing cad, cvd management, pac's.

## 2024-03-12 NOTE — PHYSICAL EXAM
[Normal] : well developed, well nourished, no acute distress [Well Nourished] : well nourished [Well Developed] : well developed [No Acute Distress] : no acute distress

## 2024-03-12 NOTE — HISTORY OF PRESENT ILLNESS
[FreeTextEntry1] : 72 y/o female with h/o cad, anxiety, asthma, hl, predm, pac's, overweight, family h/o early cvd who presents for f/up today   last seen   -Echo : 1. The left ventricular cavity is normal size. Left ventricular wall thickness is normal. Left ventricular systolic function is normal with an ejection fraction visually estimated at 60 %. 2. Right ventricular cavity is normal in size, normal wall thickness and normal systolic function. 3. There is normal left ventricular diastolic function. 4. No significant valvular disease. 5. No pericardial effusion seen. 6. There is calcification of the mitral valve annulus.  lipitor increased 40 mg qhs  seen by EP - never had heart monitor in 2023 to assess for afib  recommended sleep study which was wnl  no cp, sob, syncope, lh, palpitations  no orthopnea, pnd, edema  no fatigue    on asa and lipitor    seen by pulm for asthma    Holter : SR, avg hr 67, 775 pvc's, 69555 pac's, 14% total beats, psvt      Calcium score 2018: 38    Echo : normal LV size/fxn, ef 55-60%, no wma, mlld diastolic dysfunction, mild mac, trace mr/tr, asc ao 3.6 cm upper limit normal to mildly dilated    tested by nancy - Dr. Meek [facility] - for clotting d/o - negative    swims 2-3 times/week, walks 8-15182 steps daily, strength balance class, yoga  no mental health issues  no pregnancy complications  stress level low - meditates  eats healthy diet    had covid     PMH/PSH:  asthma  hl  anxiety  osteopenia  cad  benign breast lumpectomy  tubal ligation  herpes  IBS  overweight  abortions  covid   predm  pac's    ALL:  sulfa      MEDS:  albuterol prn  alprazolam .25 mg prn  valtrex 500 mg three times/week  THC gummies  calcium  vit D  probiotic  asa 81 mg qd  lipitor 40 mg qhs    SH:  former smoker in [age] y/o, 3-4 cigs/day  etoh  no drugs    2 children 40's - healthy  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

## 2024-03-13 LAB
CHOLEST SERPL-MCNC: 165 MG/DL
ESTIMATED AVERAGE GLUCOSE: 117 MG/DL
HBA1C MFR BLD HPLC: 5.7 %
HDLC SERPL-MCNC: 64 MG/DL
LDLC SERPL CALC-MCNC: 84 MG/DL
NONHDLC SERPL-MCNC: 101 MG/DL
TRIGL SERPL-MCNC: 96 MG/DL

## 2024-03-15 ENCOUNTER — TRANSCRIPTION ENCOUNTER (OUTPATIENT)
Age: 74
End: 2024-03-15

## 2024-03-15 LAB — APO LP(A) SERPL-MCNC: 304.8 NMOL/L

## 2024-04-02 ENCOUNTER — APPOINTMENT (OUTPATIENT)
Dept: HEART AND VASCULAR | Facility: CLINIC | Age: 74
End: 2024-04-02
Payer: MEDICARE

## 2024-04-02 PROCEDURE — 93248 EXT ECG>7D<15D REV&INTERPJ: CPT

## 2024-04-16 ENCOUNTER — APPOINTMENT (OUTPATIENT)
Dept: HEART AND VASCULAR | Facility: CLINIC | Age: 74
End: 2024-04-16

## 2024-04-16 DIAGNOSIS — I49.1 ATRIAL PREMATURE DEPOLARIZATION: ICD-10-CM

## 2024-04-16 PROCEDURE — ZZZZZ: CPT

## 2024-04-16 NOTE — DISCUSSION/SUMMARY
[FreeTextEntry1] : 73 year-old female with CAD, Asthma, HLD, Pre-DM and PACs who follows up after wearing and event monitor.  Event monitor reviewed in detail.  All questions answered.  She will follow up with Dr. Keen in 6 months or sooner if needed.  She knows to call with any questions or concerns.

## 2024-04-16 NOTE — HISTORY OF PRESENT ILLNESS
[FreeTextEntry1] : 73 year-old female with CAD, Asthma, HLD, Pre-DM and PACs who follows up after wearing and event monitor.  She saw Dr. Keen 1/2023 secondary to an event monitor showing a PAC burden of 14%. She  had a sleep study with no apnea.  SHe feels well with no limitations.  No palpitations, chest pain or syncope.  She wore a repeat event monitor for 2 weeks and presents to discuss results  Rates 41=  5.5% APC.  short burst of idioventricular rhythm.  SVT - longest 13 beats.

## 2024-04-16 NOTE — REVIEW OF SYSTEMS
[Fever] : no fever [Chills] : no chills [Feeling Fatigued] : not feeling fatigued [SOB] : no shortness of breath [Dyspnea on exertion] : not dyspnea during exertion [Palpitations] : no palpitations [Syncope] : no syncope [Negative] : Psychiatric

## 2024-04-16 NOTE — REASON FOR VISIT
[FreeTextEntry1] : As per the patients request this visit was performed using the [employer] audio platform. All components of the evaluation and management were performed per clinical routine with the exception of the in-person physical exam. If significant physical exam information was provided by the patient or noted by visual inspection on the video portion, it was included in this encounter. Other Available physiologic and diagnostic data were reviewed in detail, (e.g. remote monitoring reports, ambulatory vitals, results of prior testing). Verbal consent was obtained before proceeding with this encounter . Ahbijit Perez - office at [facility].  The patient is located at their home.  Length of visit 15

## 2024-04-17 ENCOUNTER — APPOINTMENT (OUTPATIENT)
Dept: GERIATRICS | Facility: CLINIC | Age: 74
End: 2024-04-17

## 2024-06-05 ENCOUNTER — APPOINTMENT (OUTPATIENT)
Dept: HEART AND VASCULAR | Facility: CLINIC | Age: 74
End: 2024-06-05
Payer: MEDICARE

## 2024-06-05 VITALS
SYSTOLIC BLOOD PRESSURE: 99 MMHG | BODY MASS INDEX: 25.77 KG/M2 | HEART RATE: 66 BPM | HEIGHT: 68.5 IN | WEIGHT: 172 LBS | DIASTOLIC BLOOD PRESSURE: 65 MMHG | TEMPERATURE: 98.2 F

## 2024-06-05 DIAGNOSIS — I47.29 OTHER VENTRICULAR TACHYCARDIA: ICD-10-CM

## 2024-06-05 PROCEDURE — 99214 OFFICE O/P EST MOD 30 MIN: CPT

## 2024-06-05 PROCEDURE — 36415 COLL VENOUS BLD VENIPUNCTURE: CPT

## 2024-06-05 NOTE — HISTORY OF PRESENT ILLNESS
[FreeTextEntry1] : 72 y/o female with h/o cad, anxiety, asthma, hl, predm, pac's, overweight, family h/o early cvd who presents for f/up today   last seen 3/24  -elevated LpA lipitor increased to 80  -Echo : 1. The left ventricular cavity is normal size. Left ventricular wall thickness is normal. Left ventricular systolic function is normal with an ejection fraction visually estimated at 60 %. 2. Right ventricular cavity is normal in size, normal wall thickness and normal systolic function. 3. There is normal left ventricular diastolic function. 4. No significant valvular disease. 5. No pericardial effusion seen. 6. There is calcification of the mitral valve annulus.    seen by EP -   -Holter : SR, avg hr 63, 5.5% APC. nsvt, psvt, intermittent bbb  recommended sleep study which was wnl  no cp, sob, syncope, lh, palpitations  no orthopnea, pnd, edema  no fatigue    on asa and lipitor    seen by pulm for asthma    Holter : SR, avg hr 67, 775 pvc's, 49994 pac's, 14% total beats, psvt    Calcium score 2018: 38    Echo : normal LV size/fxn, ef 55-60%, no wma, mlld diastolic dysfunction, mild mac, trace mr/tr, asc ao 3.6 cm upper limit normal to mildly dilated    tested by nancy - Dr. Meek [facility] - for clotting d/o - negative    swims 2-3 times/week, walks 8-57191 steps daily, strength balance class, yoga  no mental health issues  no pregnancy complications  stress level low - meditates  eats healthy diet    had covid     PMH/PSH:  asthma  hl  anxiety  osteopenia  cad  benign breast lumpectomy  tubal ligation  herpes  IBS  overweight  abortions  covid   predm  pac's    ALL:  sulfa      MEDS:  albuterol prn  alprazolam .25 mg prn  valtrex 500 mg three times/week  THC gummies  calcium  vit D  probiotic  asa 81 mg qd  lipitor 80 mg qhs    SH:  former smoker in [age] y/o, 3-4 cigs/day  etoh  no drugs    2 children 40's - healthy  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

## 2024-06-05 NOTE — DISCUSSION/SUMMARY
[Patient] : the patient [___ Month(s)] : in [unfilled] month(s) [FreeTextEntry1] : 74 y/o female with h/o cad, anxiety, asthma, hl, overweight, predm, pac's, family h/o early cvd who presents for f/up today.  -CTA cor ordered give cad, nsvt  -hold off on bb for nsvt given low bp  -elevated LpA  -Echo 9/23: 1. The left ventricular cavity is normal size. Left ventricular wall thickness is normal. Left ventricular systolic function is normal with an ejection fraction visually estimated at 60 %. 2. Right ventricular cavity is normal in size, normal wall thickness and normal systolic function. 3. There is normal left ventricular diastolic function. 4. No significant valvular disease. 5. No pericardial effusion seen. 6. There is calcification of the mitral valve annulus.  -ekg 3/24 - SR w pac, rbbb, no st/t changes   -labs 2023/2024 reviewed, ordered lipids, cmp, mg, tsh  -Holter 2022: SR, avg hr 67, 775 pvc's, [id number] pac's, 14% total beats, psvt  -Holter 4/24: SR, avg hr 63, 5.5% APC. nsvt, psvt, intermittent bbb  -EP recs for pac's  -continue asa, statin  -pulm f/up for asthma  -Calcium score 2018: 38  -Echo 9/21: normal LV size/fxn, ef 55-60%, mlld diastolic dysfunction, mild mac, trace mr/tr, asc ao 3.6 cm upper limit normal to mildly dilated  -counseled on cvd risk factors  -f/up 6 months for cad    I have spent 30 minutes reviewing labs, records, tests and discussing cad, cvd management, pac's.

## 2024-06-06 LAB
BASOPHILS # BLD AUTO: 0.05 K/UL
BASOPHILS NFR BLD AUTO: 0.8 %
EOSINOPHIL # BLD AUTO: 0.13 K/UL
EOSINOPHIL NFR BLD AUTO: 2.2 %
HCT VFR BLD CALC: 40.8 %
HGB BLD-MCNC: 13.4 G/DL
IMM GRANULOCYTES NFR BLD AUTO: 0 %
LYMPHOCYTES # BLD AUTO: 1.86 K/UL
LYMPHOCYTES NFR BLD AUTO: 31 %
MAN DIFF?: NORMAL
MCHC RBC-ENTMCNC: 31.8 PG
MCHC RBC-ENTMCNC: 32.8 GM/DL
MCV RBC AUTO: 96.9 FL
MONOCYTES # BLD AUTO: 0.5 K/UL
MONOCYTES NFR BLD AUTO: 8.3 %
NEUTROPHILS # BLD AUTO: 3.46 K/UL
NEUTROPHILS NFR BLD AUTO: 57.7 %
PLATELET # BLD AUTO: 184 K/UL
RBC # BLD: 4.21 M/UL
RBC # FLD: 13.2 %
TSH SERPL-ACNC: 2.39 UIU/ML
WBC # FLD AUTO: 6 K/UL

## 2024-06-07 LAB
ALBUMIN SERPL ELPH-MCNC: 4.6 G/DL
ALP BLD-CCNC: 102 U/L
ALT SERPL-CCNC: 39 U/L
ANION GAP SERPL CALC-SCNC: 16 MMOL/L
AST SERPL-CCNC: 37 U/L
BILIRUB SERPL-MCNC: 0.4 MG/DL
BUN SERPL-MCNC: 20 MG/DL
CALCIUM SERPL-MCNC: 9.6 MG/DL
CHLORIDE SERPL-SCNC: 103 MMOL/L
CHOLEST SERPL-MCNC: 158 MG/DL
CO2 SERPL-SCNC: 22 MMOL/L
CREAT SERPL-MCNC: 0.92 MG/DL
EGFR: 65 ML/MIN/1.73M2
GLUCOSE SERPL-MCNC: 94 MG/DL
HDLC SERPL-MCNC: 59 MG/DL
LDLC SERPL CALC-MCNC: 86 MG/DL
MAGNESIUM SERPL-MCNC: 2.1 MG/DL
NONHDLC SERPL-MCNC: 99 MG/DL
POTASSIUM SERPL-SCNC: 4.2 MMOL/L
PROT SERPL-MCNC: 7.1 G/DL
SODIUM SERPL-SCNC: 141 MMOL/L
TRIGL SERPL-MCNC: 67 MG/DL

## 2024-06-10 RX ORDER — ATORVASTATIN CALCIUM 80 MG/1
80 TABLET, FILM COATED ORAL
Qty: 1 | Refills: 1 | Status: ACTIVE | COMMUNITY
Start: 2022-09-15 | End: 1900-01-01

## 2024-06-10 RX ORDER — EZETIMIBE 10 MG/1
10 TABLET ORAL
Qty: 90 | Refills: 1 | Status: ACTIVE | COMMUNITY
Start: 2024-06-10 | End: 1900-01-01

## 2024-08-28 ENCOUNTER — TRANSCRIPTION ENCOUNTER (OUTPATIENT)
Age: 74
End: 2024-08-28

## 2024-09-19 ENCOUNTER — APPOINTMENT (OUTPATIENT)
Dept: CT IMAGING | Facility: HOSPITAL | Age: 74
End: 2024-09-19

## 2024-09-26 ENCOUNTER — NON-APPOINTMENT (OUTPATIENT)
Age: 74
End: 2024-09-26

## 2024-09-26 ENCOUNTER — TRANSCRIPTION ENCOUNTER (OUTPATIENT)
Age: 74
End: 2024-09-26

## 2024-09-26 LAB
CHOLEST SERPL-MCNC: 135 MG/DL
HDLC SERPL-MCNC: 62 MG/DL
LDLC SERPL CALC-MCNC: 58 MG/DL
NONHDLC SERPL-MCNC: 73 MG/DL
TRIGL SERPL-MCNC: 75 MG/DL

## 2024-09-28 ENCOUNTER — TRANSCRIPTION ENCOUNTER (OUTPATIENT)
Age: 74
End: 2024-09-28

## 2024-10-01 ENCOUNTER — TRANSCRIPTION ENCOUNTER (OUTPATIENT)
Age: 74
End: 2024-10-01

## 2024-12-05 ENCOUNTER — TRANSCRIPTION ENCOUNTER (OUTPATIENT)
Age: 74
End: 2024-12-05

## 2024-12-19 ENCOUNTER — TRANSCRIPTION ENCOUNTER (OUTPATIENT)
Age: 74
End: 2024-12-19

## 2024-12-25 PROBLEM — F10.90 ALCOHOL USE: Status: ACTIVE | Noted: 2021-02-05

## 2025-01-15 ENCOUNTER — NON-APPOINTMENT (OUTPATIENT)
Age: 75
End: 2025-01-15

## 2025-03-03 ENCOUNTER — NON-APPOINTMENT (OUTPATIENT)
Age: 75
End: 2025-03-03

## 2025-03-03 ENCOUNTER — APPOINTMENT (OUTPATIENT)
Dept: HEART AND VASCULAR | Facility: CLINIC | Age: 75
End: 2025-03-03
Payer: MEDICARE

## 2025-03-03 VITALS
HEART RATE: 67 BPM | DIASTOLIC BLOOD PRESSURE: 71 MMHG | BODY MASS INDEX: 26.07 KG/M2 | OXYGEN SATURATION: 96 % | WEIGHT: 172 LBS | SYSTOLIC BLOOD PRESSURE: 104 MMHG | HEIGHT: 68 IN | TEMPERATURE: 97.7 F

## 2025-03-03 PROCEDURE — 93000 ELECTROCARDIOGRAM COMPLETE: CPT

## 2025-03-03 PROCEDURE — G2211 COMPLEX E/M VISIT ADD ON: CPT

## 2025-03-03 PROCEDURE — 99214 OFFICE O/P EST MOD 30 MIN: CPT

## 2025-04-23 ENCOUNTER — TRANSCRIPTION ENCOUNTER (OUTPATIENT)
Age: 75
End: 2025-04-23

## 2025-06-05 ENCOUNTER — TRANSCRIPTION ENCOUNTER (OUTPATIENT)
Age: 75
End: 2025-06-05